# Patient Record
Sex: FEMALE | Race: WHITE
[De-identification: names, ages, dates, MRNs, and addresses within clinical notes are randomized per-mention and may not be internally consistent; named-entity substitution may affect disease eponyms.]

---

## 2022-09-29 ENCOUNTER — NON-APPOINTMENT (OUTPATIENT)
Age: 77
End: 2022-09-29

## 2022-09-29 ENCOUNTER — APPOINTMENT (OUTPATIENT)
Dept: ORTHOPEDIC SURGERY | Facility: CLINIC | Age: 77
End: 2022-09-29

## 2022-09-29 DIAGNOSIS — M23.91 UNSPECIFIED INTERNAL DERANGEMENT OF RIGHT KNEE: ICD-10-CM

## 2022-09-29 PROCEDURE — 73562 X-RAY EXAM OF KNEE 3: CPT | Mod: RT

## 2022-09-29 PROCEDURE — 99203 OFFICE O/P NEW LOW 30 MIN: CPT

## 2022-09-29 NOTE — DISCUSSION/SUMMARY
[de-identified] : She will ice give it time work on range of motion if the symptoms fail to improve she'll let me know. Follow up will be as needed.

## 2022-09-29 NOTE — HISTORY OF PRESENT ILLNESS
Call to [facility] at [phone] to relay MD message below and find out about timing of appointment for Marva     Detailed voicemail left on their voicemail requesting call back to advise. Call back number provided, awaiting call back.     Call to abdullahi Vance was placed to notify that we are waiting to hear back from [facility] - voicemail left for her advising we will let her know when we hear back from [facility].    [de-identified] : Location:r knee \par Quality: cramping, dull ache, burning, \par Duration:9/21/22\par Context:fell \par Aggravating Factors:walking \par Conservative treatment:\par Associated Symptoms:swelling \par Prior Studies:n/a \par

## 2022-09-29 NOTE — PHYSICAL EXAM
[de-identified] : Right knee shows some swelling and some black and blue. There is some tenderness and patellofemoral joint with crepitus. No ligament instability she has full extension to 120° of flexion [de-identified] : right knee x-ray 3 views shows moderate to severe degenerative arthritis

## 2024-03-26 ENCOUNTER — APPOINTMENT (OUTPATIENT)
Dept: ORTHOPEDIC SURGERY | Facility: CLINIC | Age: 79
End: 2024-03-26
Payer: MEDICARE

## 2024-03-26 DIAGNOSIS — M17.11 UNILATERAL PRIMARY OSTEOARTHRITIS, RIGHT KNEE: ICD-10-CM

## 2024-03-26 PROCEDURE — 99215 OFFICE O/P EST HI 40 MIN: CPT

## 2024-03-26 NOTE — DISCUSSION/SUMMARY
[de-identified] : Patient I talked at length about the underlying etiology of her right knee intermittent pain.  Patient I discussed the fact that she has a probable 7 out of 10 in terms of arthritis of the lateral compartment radiographs from 2 years ago indicated significant cartilage loss and early secondary findings of osteophyte and cyst formation and mild valgus inclination.  I mentioned the patient as time progresses the symptoms may worsen I believe that she would be best served at this point with icing the knee when she is symptomatic she will be placed on Celebrex 200 mg p.o. twice daily for 3-day course then to be taken thereafter as needed for pain.  If the pain does not see sustained symptomatic improvement with limited use of the Celebrex he may return at that point we may consider cortisone and/or HA injections.  Patient is also advised to use a Genutrain knee light weight support brace when she is dancing and symptomatic.  We also briefly touched on the potential to need to consider knee replacement surgery if she not see sustained symptomatic improvement with any of these conservative modalities.  Today's consultation lasted 45 minutes.

## 2024-03-26 NOTE — PHYSICAL EXAM
[de-identified] : Right knee has some soft tissue swelling but minimal warmth there is some mild tenderness palpation of the lateral joint line range of motion is 5 to 125 degrees knee stable extra stress on stress both full extension and 90 degrees of flexion.

## 2024-03-26 NOTE — REASON FOR VISIT
[Follow-Up Visit] : a follow-up visit for [Knee Pain] : knee pain [FreeTextEntry2] : RIGHT KNEE PAIN. SHE KNOWS SHE HAS BONE ON BONE ARTHRITIS

## 2024-03-26 NOTE — HISTORY OF PRESENT ILLNESS
[de-identified] : First-time visit with me for this 78-year-old female she is seen another orthopedic surgeon here at [facility] in the past regarding right knee pain.  Patient presents today with 3-week history of right knee pain she is an avid square dancer believes that she have possibly irritated knee while doing her dancing routine.  Patient states she has been having some pain and swelling and then the pain subsided but now she has had some episodes of right calf pain.  She is here to have it evaluated for first time.  She had radiographs in 2022 by the other physician in the practice here which showed fairly advanced lateral compartment osteoarthritis with near complete cartilage loss.